# Patient Record
Sex: MALE | Race: WHITE | NOT HISPANIC OR LATINO | Employment: FULL TIME | URBAN - METROPOLITAN AREA
[De-identification: names, ages, dates, MRNs, and addresses within clinical notes are randomized per-mention and may not be internally consistent; named-entity substitution may affect disease eponyms.]

---

## 2023-10-26 PROBLEM — R05.1 ACUTE COUGH: Status: ACTIVE | Noted: 2022-09-26

## 2023-10-26 PROBLEM — R52 PAIN: Status: ACTIVE | Noted: 2023-10-26

## 2023-10-26 PROBLEM — R21 RASH: Status: ACTIVE | Noted: 2023-06-06

## 2023-10-26 PROBLEM — R22.0 FACIAL MASS: Status: ACTIVE | Noted: 2019-09-28

## 2023-10-26 PROBLEM — N50.89 SCROTAL SWELLING: Status: ACTIVE | Noted: 2023-10-26

## 2023-10-26 PROBLEM — U07.1 COVID-19: Status: ACTIVE | Noted: 2022-09-26

## 2023-10-26 PROBLEM — R35.1 NOCTURIA: Status: ACTIVE | Noted: 2022-10-27

## 2023-10-26 PROBLEM — R11.10 VOMITING: Status: ACTIVE | Noted: 2023-10-26

## 2023-10-26 PROBLEM — K62.5 RECTAL BLEEDING: Status: ACTIVE | Noted: 2018-09-23

## 2023-10-26 PROBLEM — R73.01 IMPAIRED FASTING GLUCOSE: Status: ACTIVE | Noted: 2021-10-25

## 2023-10-26 PROBLEM — J06.9 UPPER RESPIRATORY INFECTION: Status: ACTIVE | Noted: 2022-09-26

## 2023-10-26 PROBLEM — E78.5 HYPERLIPIDEMIA: Status: ACTIVE | Noted: 2019-09-26

## 2023-10-26 PROBLEM — N40.0 ENLARGED PROSTATE: Status: ACTIVE | Noted: 2018-09-23

## 2023-10-26 PROBLEM — E78.49 OTHER HYPERLIPIDEMIA: Status: ACTIVE | Noted: 2018-09-23

## 2023-10-26 PROBLEM — R53.83 FATIGUE: Status: ACTIVE | Noted: 2021-10-18

## 2023-10-26 PROBLEM — K58.9 IRRITABLE BOWEL SYNDROME: Status: ACTIVE | Noted: 2019-09-26

## 2023-10-26 PROBLEM — R63.5 WEIGHT GAIN: Status: ACTIVE | Noted: 2021-10-18

## 2023-10-26 PROBLEM — B86 SCABIES: Status: ACTIVE | Noted: 2023-05-15

## 2023-10-26 PROBLEM — K40.90 LEFT INGUINAL HERNIA: Status: ACTIVE | Noted: 2023-10-26

## 2023-10-26 PROBLEM — K57.90 DIVERTICULOSIS: Status: ACTIVE | Noted: 2018-09-23

## 2023-10-26 PROBLEM — E66.9 OBESITY: Status: ACTIVE | Noted: 2020-10-13

## 2023-10-26 PROBLEM — R97.20 ELEVATED PSA: Status: ACTIVE | Noted: 2021-05-04

## 2023-10-26 PROBLEM — E80.4 GILBERTS SYNDROME: Status: ACTIVE | Noted: 2020-10-13

## 2023-10-26 PROBLEM — R89.9 ABNORMAL LABORATORY TEST RESULT: Status: ACTIVE | Noted: 2020-11-17

## 2023-10-26 RX ORDER — NYSTATIN 100000 [USP'U]/ML
5 SUSPENSION ORAL 3 TIMES DAILY
COMMUNITY
End: 2023-11-03 | Stop reason: WASHOUT

## 2023-10-26 RX ORDER — ASPIRIN 81 MG/1
81 TABLET ORAL DAILY
COMMUNITY
End: 2023-11-03 | Stop reason: WASHOUT

## 2023-10-26 RX ORDER — MONTELUKAST SODIUM 4 MG/1
2 TABLET, CHEWABLE ORAL DAILY
COMMUNITY
End: 2023-11-03 | Stop reason: WASHOUT

## 2023-10-26 RX ORDER — CEFPROZIL 500 MG/1
500 TABLET, FILM COATED ORAL DAILY
COMMUNITY
End: 2023-11-03 | Stop reason: WASHOUT

## 2023-10-26 RX ORDER — HYDROCODONE BITARTRATE AND ACETAMINOPHEN 10; 325 MG/1; MG/1
1 TABLET ORAL EVERY 4 HOURS PRN
COMMUNITY
End: 2023-11-03 | Stop reason: WASHOUT

## 2023-10-26 RX ORDER — PERMETHRIN 50 MG/G
CREAM TOPICAL
COMMUNITY
Start: 2023-05-15 | End: 2023-11-03 | Stop reason: WASHOUT

## 2023-10-31 ENCOUNTER — ANCILLARY PROCEDURE (OUTPATIENT)
Dept: RADIOLOGY | Facility: CLINIC | Age: 68
End: 2023-10-31
Payer: COMMERCIAL

## 2023-10-31 ENCOUNTER — LAB (OUTPATIENT)
Dept: LAB | Facility: LAB | Age: 68
End: 2023-10-31
Payer: COMMERCIAL

## 2023-10-31 ENCOUNTER — OFFICE VISIT (OUTPATIENT)
Dept: PRIMARY CARE | Facility: CLINIC | Age: 68
End: 2023-10-31
Payer: COMMERCIAL

## 2023-10-31 VITALS
DIASTOLIC BLOOD PRESSURE: 76 MMHG | WEIGHT: 209 LBS | HEIGHT: 72 IN | HEART RATE: 80 BPM | SYSTOLIC BLOOD PRESSURE: 134 MMHG | BODY MASS INDEX: 28.31 KG/M2

## 2023-10-31 DIAGNOSIS — Z12.5 SCREENING FOR PROSTATE CANCER: ICD-10-CM

## 2023-10-31 DIAGNOSIS — E80.4 GILBERTS SYNDROME: ICD-10-CM

## 2023-10-31 DIAGNOSIS — Z00.00 ROUTINE MEDICAL EXAM: ICD-10-CM

## 2023-10-31 DIAGNOSIS — E78.49 OTHER HYPERLIPIDEMIA: ICD-10-CM

## 2023-10-31 DIAGNOSIS — E66.3 OVERWEIGHT: ICD-10-CM

## 2023-10-31 DIAGNOSIS — K61.1 PERIRECTAL ABSCESS: ICD-10-CM

## 2023-10-31 DIAGNOSIS — Z00.00 ROUTINE GENERAL MEDICAL EXAMINATION AT HEALTH CARE FACILITY: Primary | ICD-10-CM

## 2023-10-31 DIAGNOSIS — R73.01 IMPAIRED FASTING GLUCOSE: ICD-10-CM

## 2023-10-31 DIAGNOSIS — R97.20 ELEVATED PSA: ICD-10-CM

## 2023-10-31 PROBLEM — J06.9 UPPER RESPIRATORY INFECTION: Status: RESOLVED | Noted: 2022-09-26 | Resolved: 2023-10-31

## 2023-10-31 PROBLEM — B86 SCABIES: Status: RESOLVED | Noted: 2023-05-15 | Resolved: 2023-10-31

## 2023-10-31 PROBLEM — U07.1 COVID-19: Status: RESOLVED | Noted: 2022-09-26 | Resolved: 2023-10-31

## 2023-10-31 PROBLEM — R05.1 ACUTE COUGH: Status: RESOLVED | Noted: 2022-09-26 | Resolved: 2023-10-31

## 2023-10-31 PROBLEM — R21 RASH: Status: RESOLVED | Noted: 2023-06-06 | Resolved: 2023-10-31

## 2023-10-31 PROBLEM — R11.10 VOMITING: Status: RESOLVED | Noted: 2023-10-26 | Resolved: 2023-10-31

## 2023-10-31 LAB
ALBUMIN SERPL-MCNC: 4.7 G/DL (ref 3.5–5)
ALP BLD-CCNC: 84 U/L (ref 35–125)
ALT SERPL-CCNC: 16 U/L (ref 5–40)
ANION GAP SERPL CALC-SCNC: 12 MMOL/L
AST SERPL-CCNC: 18 U/L (ref 5–40)
BASOPHILS # BLD AUTO: 0.05 X10*3/UL (ref 0–0.1)
BASOPHILS NFR BLD AUTO: 0.7 %
BILIRUB SERPL-MCNC: 3.1 MG/DL (ref 0.1–1.2)
BUN SERPL-MCNC: 14 MG/DL (ref 8–25)
CALCIUM SERPL-MCNC: 9.6 MG/DL (ref 8.5–10.4)
CHLORIDE SERPL-SCNC: 103 MMOL/L (ref 97–107)
CHOLEST SERPL-MCNC: 191 MG/DL (ref 133–200)
CHOLEST/HDLC SERPL: 3.1 {RATIO}
CO2 SERPL-SCNC: 29 MMOL/L (ref 24–31)
CREAT SERPL-MCNC: 1 MG/DL (ref 0.4–1.6)
EOSINOPHIL # BLD AUTO: 0.22 X10*3/UL (ref 0–0.7)
EOSINOPHIL NFR BLD AUTO: 3.1 %
ERYTHROCYTE [DISTWIDTH] IN BLOOD BY AUTOMATED COUNT: 13.2 % (ref 11.5–14.5)
EST. AVERAGE GLUCOSE BLD GHB EST-MCNC: 105 MG/DL
GFR SERPL CREATININE-BSD FRML MDRD: 82 ML/MIN/1.73M*2
GLUCOSE SERPL-MCNC: 89 MG/DL (ref 65–99)
HBA1C MFR BLD: 5.3 %
HCT VFR BLD AUTO: 45.4 % (ref 41–52)
HDLC SERPL-MCNC: 61 MG/DL
HGB BLD-MCNC: 14.9 G/DL (ref 13.5–17.5)
IMM GRANULOCYTES # BLD AUTO: 0.02 X10*3/UL (ref 0–0.7)
IMM GRANULOCYTES NFR BLD AUTO: 0.3 % (ref 0–0.9)
LDLC SERPL CALC-MCNC: 109 MG/DL (ref 65–130)
LYMPHOCYTES # BLD AUTO: 1.54 X10*3/UL (ref 1.2–4.8)
LYMPHOCYTES NFR BLD AUTO: 22 %
MCH RBC QN AUTO: 30.9 PG (ref 26–34)
MCHC RBC AUTO-ENTMCNC: 32.8 G/DL (ref 32–36)
MCV RBC AUTO: 94 FL (ref 80–100)
MONOCYTES # BLD AUTO: 0.82 X10*3/UL (ref 0.1–1)
MONOCYTES NFR BLD AUTO: 11.7 %
NEUTROPHILS # BLD AUTO: 4.35 X10*3/UL (ref 1.2–7.7)
NEUTROPHILS NFR BLD AUTO: 62.2 %
NRBC BLD-RTO: 0 /100 WBCS (ref 0–0)
PLATELET # BLD AUTO: 322 X10*3/UL (ref 150–450)
PMV BLD AUTO: 10.1 FL (ref 7.5–11.5)
POC APPEARANCE, URINE: CLEAR
POC BILIRUBIN, URINE: NEGATIVE
POC BLOOD, URINE: ABNORMAL
POC COLOR, URINE: YELLOW
POC GLUCOSE, URINE: NEGATIVE MG/DL
POC KETONES, URINE: NEGATIVE MG/DL
POC LEUKOCYTES, URINE: NEGATIVE
POC NITRITE,URINE: NEGATIVE
POC PH, URINE: 5.5 PH
POC PROTEIN, URINE: NEGATIVE MG/DL
POC SPECIFIC GRAVITY, URINE: 1.02
POC UROBILINOGEN, URINE: 0.2 EU/DL
POTASSIUM SERPL-SCNC: 4.8 MMOL/L (ref 3.4–5.1)
PROT SERPL-MCNC: 6.9 G/DL (ref 5.9–7.9)
PSA SERPL-MCNC: 3.9 NG/ML
RBC # BLD AUTO: 4.82 X10*6/UL (ref 4.5–5.9)
SODIUM SERPL-SCNC: 144 MMOL/L (ref 133–145)
TRIGL SERPL-MCNC: 107 MG/DL (ref 40–150)
TSH SERPL DL<=0.05 MIU/L-ACNC: 2.43 MIU/L (ref 0.27–4.2)
WBC # BLD AUTO: 7 X10*3/UL (ref 4.4–11.3)

## 2023-10-31 PROCEDURE — G0439 PPPS, SUBSEQ VISIT: HCPCS | Performed by: PHYSICIAN ASSISTANT

## 2023-10-31 PROCEDURE — 36415 COLL VENOUS BLD VENIPUNCTURE: CPT

## 2023-10-31 PROCEDURE — 81003 URINALYSIS AUTO W/O SCOPE: CPT | Performed by: PHYSICIAN ASSISTANT

## 2023-10-31 PROCEDURE — 80061 LIPID PANEL: CPT

## 2023-10-31 PROCEDURE — 85025 COMPLETE CBC W/AUTO DIFF WBC: CPT

## 2023-10-31 PROCEDURE — 84153 ASSAY OF PSA TOTAL: CPT

## 2023-10-31 PROCEDURE — 1159F MED LIST DOCD IN RCRD: CPT | Performed by: PHYSICIAN ASSISTANT

## 2023-10-31 PROCEDURE — 80053 COMPREHEN METABOLIC PANEL: CPT

## 2023-10-31 PROCEDURE — 84443 ASSAY THYROID STIM HORMONE: CPT

## 2023-10-31 PROCEDURE — 1160F RVW MEDS BY RX/DR IN RCRD: CPT | Performed by: PHYSICIAN ASSISTANT

## 2023-10-31 PROCEDURE — 83036 HEMOGLOBIN GLYCOSYLATED A1C: CPT

## 2023-10-31 PROCEDURE — 71046 X-RAY EXAM CHEST 2 VIEWS: CPT | Mod: FY

## 2023-10-31 PROCEDURE — 1036F TOBACCO NON-USER: CPT | Performed by: PHYSICIAN ASSISTANT

## 2023-10-31 PROCEDURE — 1126F AMNT PAIN NOTED NONE PRSNT: CPT | Performed by: PHYSICIAN ASSISTANT

## 2023-10-31 RX ORDER — AMOXICILLIN AND CLAVULANATE POTASSIUM 500; 125 MG/1; MG/1
500 TABLET, FILM COATED ORAL 3 TIMES DAILY
Qty: 21 TABLET | Refills: 0 | Status: SHIPPED | OUTPATIENT
Start: 2023-10-31 | End: 2023-11-07

## 2023-10-31 ASSESSMENT — PAIN SCALES - GENERAL: PAINLEVEL: 0-NO PAIN

## 2023-10-31 NOTE — PROGRESS NOTES
"Subjective   Reason for Visit: Rios Malcolm is an 68 y.o. male here for a Medicare Wellness visit.          Reviewed all medications by prescribing practitioner or clinical pharmacist (such as prescriptions, OTCs, herbal therapies and supplements) and documented in the medical record.    Hyperlipidemia  This is a chronic problem. The current episode started more than 1 year ago. The problem is controlled. He has no history of obesity. Associated symptoms include myalgias. Pertinent negatives include no chest pain, focal weakness, leg pain or shortness of breath. Compliance problems include adherence to exercise and adherence to diet.  Risk factors for coronary artery disease include male sex and dyslipidemia.   Benign Prostatic Hypertrophy  This is a chronic problem. The problem is unchanged. Pertinent negatives include no dysuria.       Patient Care Team:  Jagruti Franks PA-C as PCP - General (Family Medicine)     Review of Systems   Constitutional:  Negative for activity change and appetite change.   HENT:  Negative for nosebleeds.    Eyes:  Negative for visual disturbance.   Respiratory:  Negative for chest tightness and shortness of breath.    Cardiovascular:  Negative for chest pain.   Gastrointestinal:  Negative for abdominal pain, blood in stool, constipation and diarrhea.   Genitourinary:  Negative for dysuria and testicular pain.   Musculoskeletal:  Positive for arthralgias and myalgias.   Skin:  Negative for color change.   Neurological:  Negative for focal weakness, numbness and headaches.   Psychiatric/Behavioral:  Negative for agitation.        Objective   Vitals:  /76   Pulse 80   Ht 1.822 m (5' 11.75\")   Wt 94.8 kg (209 lb)   BMI 28.54 kg/m²       Physical Exam  HENT:      Head: Normocephalic.      Right Ear: Tympanic membrane normal.      Left Ear: Tympanic membrane normal.      Nose: Nose normal.      Mouth/Throat:      Mouth: Mucous membranes are moist.   Eyes:      Extraocular " Movements: Extraocular movements intact.      Pupils: Pupils are equal, round, and reactive to light.   Neck:      Thyroid: No thyroid mass or thyromegaly.      Vascular: No carotid bruit.   Cardiovascular:      Rate and Rhythm: Normal rate and regular rhythm.      Pulses: Normal pulses.      Heart sounds: No murmur heard.  Pulmonary:      Effort: Pulmonary effort is normal.   Abdominal:      General: Abdomen is flat. Bowel sounds are normal.      Tenderness: There is no abdominal tenderness.      Hernia: There is no hernia in the left inguinal area or right inguinal area.   Genitourinary:     Penis: Normal.       Testes: Normal.      Comments: Anal area he has a small slightly protruding circular erythematous lesionWith minimal drainage.  He has a history of perirectal abscesses.  Musculoskeletal:      Cervical back: Normal range of motion.   Lymphadenopathy:      Cervical: No cervical adenopathy.   Skin:     General: Skin is warm.   Neurological:      Mental Status: He is alert. Mental status is at baseline.         Assessment/Plan   Problem List Items Addressed This Visit       Impaired fasting glucose    Hyperlipidemia     Other Visit Diagnoses       Routine general medical examination at health care facility    -  Primary    Routine medical exam        Screening for prostate cancer              We will treat for the abscess as well.  Continue warm sits baths and compresses should follow-up with Dr. Sinclair.

## 2023-11-01 DIAGNOSIS — R97.20 ELEVATED PSA: ICD-10-CM

## 2023-11-01 DIAGNOSIS — E80.4 GILBERTS SYNDROME: Primary | ICD-10-CM

## 2023-11-03 ASSESSMENT — ENCOUNTER SYMPTOMS
HEADACHES: 0
BLOOD IN STOOL: 0
APPETITE CHANGE: 0
DIARRHEA: 0
ABDOMINAL PAIN: 0
NUMBNESS: 0
AGITATION: 0
ACTIVITY CHANGE: 0
FOCAL WEAKNESS: 0
CHEST TIGHTNESS: 0
LEG PAIN: 0
COLOR CHANGE: 0
DYSURIA: 0
MYALGIAS: 1
ARTHRALGIAS: 1
CONSTIPATION: 0
SHORTNESS OF BREATH: 0

## 2023-11-10 ENCOUNTER — OFFICE VISIT (OUTPATIENT)
Dept: SURGERY | Facility: CLINIC | Age: 68
End: 2023-11-10
Payer: COMMERCIAL

## 2023-11-10 VITALS
HEART RATE: 56 BPM | BODY MASS INDEX: 28.71 KG/M2 | TEMPERATURE: 97.5 F | WEIGHT: 212 LBS | HEIGHT: 72 IN | SYSTOLIC BLOOD PRESSURE: 160 MMHG | DIASTOLIC BLOOD PRESSURE: 96 MMHG

## 2023-11-10 DIAGNOSIS — K61.1 PERIRECTAL ABSCESS: ICD-10-CM

## 2023-11-10 DIAGNOSIS — K60.3 ANAL FISTULA: Primary | ICD-10-CM

## 2023-11-10 PROCEDURE — 1126F AMNT PAIN NOTED NONE PRSNT: CPT | Performed by: SURGERY

## 2023-11-10 PROCEDURE — 99213 OFFICE O/P EST LOW 20 MIN: CPT | Performed by: SURGERY

## 2023-11-10 PROCEDURE — 1160F RVW MEDS BY RX/DR IN RCRD: CPT | Performed by: SURGERY

## 2023-11-10 PROCEDURE — 1159F MED LIST DOCD IN RCRD: CPT | Performed by: SURGERY

## 2023-11-10 PROCEDURE — 99203 OFFICE O/P NEW LOW 30 MIN: CPT | Performed by: SURGERY

## 2023-11-10 PROCEDURE — 1036F TOBACCO NON-USER: CPT | Performed by: SURGERY

## 2023-11-10 ASSESSMENT — ENCOUNTER SYMPTOMS
LOSS OF SENSATION IN FEET: 0
OCCASIONAL FEELINGS OF UNSTEADINESS: 0
EYES NEGATIVE: 1
CARDIOVASCULAR NEGATIVE: 1
GASTROINTESTINAL NEGATIVE: 1
DEPRESSION: 0
CONSTITUTIONAL NEGATIVE: 1
RESPIRATORY NEGATIVE: 1

## 2023-11-10 ASSESSMENT — PATIENT HEALTH QUESTIONNAIRE - PHQ9
2. FEELING DOWN, DEPRESSED OR HOPELESS: NOT AT ALL
SUM OF ALL RESPONSES TO PHQ9 QUESTIONS 1 & 2: 0
1. LITTLE INTEREST OR PLEASURE IN DOING THINGS: NOT AT ALL

## 2023-11-10 ASSESSMENT — PAIN SCALES - GENERAL: PAINLEVEL: 0-NO PAIN

## 2023-11-10 NOTE — PROGRESS NOTES
[facility]: GENERAL SURGERY  PROGRESS NOTE      Rios Malcolm is a 68 y.o. male that is presenting today for New Patient Visit (Ref from [facility] for clara rectal abscess. Has clear/yellow color, no odor. Small amount of blood).    ASSESSMENT / PLAN:  Diagnoses and all orders for this visit:  Anal fistula  Plan for exam under anesthesia with fistulotomy and possible seton placement., Risks and benefits of this procedure were discussed with patient, including: post operative pain, bleeding, infection, chronic wound, anal stenosis, incontinence, change in bowel function, urinary retention, need for additional intervention, and recurrence of fistula tract    Patient Active Problem List   Diagnosis    Weight gain    Scrotal swelling    Rectal bleeding    Pain    Obesity    Nocturia    Left inguinal hernia    Irritable bowel syndrome    Impaired fasting glucose    Hyperlipidemia    [place] syndrome    Fatigue    Facial mass    Enlarged prostate    Elevated PSA    Diverticulosis    Abnormal laboratory test result     Subjective   Seen by PCP 10/31/2023, history of perirectal abscess and wound with drainage.  Review of Systems   Constitutional: Negative.    HENT: Negative.     Eyes: Negative.    Respiratory: Negative.     Cardiovascular: Negative.    Gastrointestinal: Negative.    Genitourinary: Negative.    Skin: Negative.    All other systems reviewed and are negative.     Objective   Vitals:    11/10/23 1414   BP: (!) 160/96   Pulse: 56   Temp: 36.4 °C (97.5 °F)      Physical Exam  Constitutional:       Appearance: Normal appearance.   HENT:      Head: Normocephalic.   Eyes:      Pupils: Pupils are equal, round, and reactive to light.   Cardiovascular:      Rate and Rhythm: Normal rate.   Pulmonary:      Effort: Pulmonary effort is normal.   Abdominal:      General: Abdomen is flat. Bowel sounds are normal.      Palpations: Abdomen is soft.   Genitourinary:     Comments: The patient was placed in  "left lateral decubitus position. Perianal skin was examined with an opening in the posterior midline and a palpable tract stent with a small abscess and anal fistula.  This was actively draining purulence on exam..  No external hemorrhoids identified.  Digital rectal exam revealed normal tone with good squeeze.  No palpable masses appreciated.  Skin:     General: Skin is warm.   Neurological:      General: No focal deficit present.      Mental Status: He is alert.         Diagnostic Results   Lab Results   Component Value Date    GLUCOSE 89 10/31/2023    CALCIUM 9.6 10/31/2023     10/31/2023    K 4.8 10/31/2023    CO2 29 10/31/2023     10/31/2023    BUN 14 10/31/2023    CREATININE 1.00 10/31/2023     Lab Results   Component Value Date    ALT 16 10/31/2023    AST 18 10/31/2023    ALKPHOS 84 10/31/2023    BILITOT 3.1 (H) 10/31/2023     Lab Results   Component Value Date    WBC 7.0 10/31/2023    HGB 14.9 10/31/2023    HCT 45.4 10/31/2023    MCV 94 10/31/2023     10/31/2023     Lab Results   Component Value Date    CHOL 191 10/31/2023    CHOL 205 (H) 10/27/2022    CHOL 189 10/18/2021     Lab Results   Component Value Date    HDL 61.0 10/31/2023    HDL 69 10/27/2022    HDL 62 10/18/2021     Lab Results   Component Value Date    LDLCALC 109 10/31/2023    LDLCALC 118 10/27/2022    LDLCALC 106 10/18/2021     Lab Results   Component Value Date    TRIG 107 10/31/2023    TRIG 90 10/27/2022    TRIG 105 10/18/2021     No components found for: \"CHOLHDL\"  Lab Results   Component Value Date    HGBA1C 5.3 10/31/2023     Other labs not included in the list above were reviewed either before or during this encounter.    History    Past Medical History:   Diagnosis Date    Abscess     clara rectal    Gallbladder attack     Hyperlipemia      Past Surgical History:   Procedure Laterality Date    ABSCESS DRAINAGE  2006    clara rectal abscess    CHOLECYSTECTOMY      HERNIA REPAIR      RIGHT COLECTOMY       Family History "   Problem Relation Name Age of Onset    No Known Problems Father      Breast cancer Sister      Ovarian cancer Sister       No Known Allergies  Current Outpatient Medications on File Prior to Visit   Medication Sig Dispense Refill    [] amoxicillin-pot clavulanate (Augmentin) 500-125 mg tablet Take 1 tablet (500 mg) by mouth 3 times a day for 7 days. 21 tablet 0     No current facility-administered medications on file prior to visit.     Immunization History   Administered Date(s) Administered    Flu vaccine, quadrivalent, high-dose, preservative free, age 65y+ (FLUZONE) 10/18/2021, 10/27/2022    Influenza, injectable, quadrivalent 2018, 2019, 10/13/2020    Influenza, seasonal, injectable 2014, 2017    Influenza, seasonal, injectable, preservative free 2012, 2015    Moderna SARS-CoV-2 Vaccination 2021, 2021    Pfizer COVID-19 vaccine, Fall , 12 years and older, (30mcg/0.3mL) 10/07/2023    Pfizer COVID-19 vaccine, bivalent, age 12 years and older (30 mcg/0.3 mL) 2023    Pfizer Gray Cap SARS-CoV-2 2022    Pfizer Purple Cap SARS-CoV-2 2021    Pneumococcal polysaccharide vaccine, 23-valent, age 2 years and older (PNEUMOVAX 23) 2013, 2018    Tdap vaccine, age 7 year and older (BOOSTRIX) 2011, 10/18/2021    Zoster vaccine, recombinant, adult (SHINGRIX) 2022, 2023     Patient's medical history was reviewed and updated either before or during this encounter.    Raimundo Taylor MD

## 2023-11-10 NOTE — H&P (VIEW-ONLY)
[facility]: GENERAL SURGERY  PROGRESS NOTE      Rios Malcolm is a 68 y.o. male that is presenting today for New Patient Visit (Ref from [facility] for clara rectal abscess. Has clear/yellow color, no odor. Small amount of blood).    ASSESSMENT / PLAN:  Diagnoses and all orders for this visit:  Anal fistula  Plan for exam under anesthesia with fistulotomy and possible seton placement., Risks and benefits of this procedure were discussed with patient, including: post operative pain, bleeding, infection, chronic wound, anal stenosis, incontinence, change in bowel function, urinary retention, need for additional intervention, and recurrence of fistula tract    Patient Active Problem List   Diagnosis    Weight gain    Scrotal swelling    Rectal bleeding    Pain    Obesity    Nocturia    Left inguinal hernia    Irritable bowel syndrome    Impaired fasting glucose    Hyperlipidemia    [place] syndrome    Fatigue    Facial mass    Enlarged prostate    Elevated PSA    Diverticulosis    Abnormal laboratory test result     Subjective   Seen by PCP 10/31/2023, history of perirectal abscess and wound with drainage.  Review of Systems   Constitutional: Negative.    HENT: Negative.     Eyes: Negative.    Respiratory: Negative.     Cardiovascular: Negative.    Gastrointestinal: Negative.    Genitourinary: Negative.    Skin: Negative.    All other systems reviewed and are negative.     Objective   Vitals:    11/10/23 1414   BP: (!) 160/96   Pulse: 56   Temp: 36.4 °C (97.5 °F)      Physical Exam  Constitutional:       Appearance: Normal appearance.   HENT:      Head: Normocephalic.   Eyes:      Pupils: Pupils are equal, round, and reactive to light.   Cardiovascular:      Rate and Rhythm: Normal rate.   Pulmonary:      Effort: Pulmonary effort is normal.   Abdominal:      General: Abdomen is flat. Bowel sounds are normal.      Palpations: Abdomen is soft.   Genitourinary:     Comments: The patient was placed in  "left lateral decubitus position. Perianal skin was examined with an opening in the posterior midline and a palpable tract stent with a small abscess and anal fistula.  This was actively draining purulence on exam..  No external hemorrhoids identified.  Digital rectal exam revealed normal tone with good squeeze.  No palpable masses appreciated.  Skin:     General: Skin is warm.   Neurological:      General: No focal deficit present.      Mental Status: He is alert.         Diagnostic Results   Lab Results   Component Value Date    GLUCOSE 89 10/31/2023    CALCIUM 9.6 10/31/2023     10/31/2023    K 4.8 10/31/2023    CO2 29 10/31/2023     10/31/2023    BUN 14 10/31/2023    CREATININE 1.00 10/31/2023     Lab Results   Component Value Date    ALT 16 10/31/2023    AST 18 10/31/2023    ALKPHOS 84 10/31/2023    BILITOT 3.1 (H) 10/31/2023     Lab Results   Component Value Date    WBC 7.0 10/31/2023    HGB 14.9 10/31/2023    HCT 45.4 10/31/2023    MCV 94 10/31/2023     10/31/2023     Lab Results   Component Value Date    CHOL 191 10/31/2023    CHOL 205 (H) 10/27/2022    CHOL 189 10/18/2021     Lab Results   Component Value Date    HDL 61.0 10/31/2023    HDL 69 10/27/2022    HDL 62 10/18/2021     Lab Results   Component Value Date    LDLCALC 109 10/31/2023    LDLCALC 118 10/27/2022    LDLCALC 106 10/18/2021     Lab Results   Component Value Date    TRIG 107 10/31/2023    TRIG 90 10/27/2022    TRIG 105 10/18/2021     No components found for: \"CHOLHDL\"  Lab Results   Component Value Date    HGBA1C 5.3 10/31/2023     Other labs not included in the list above were reviewed either before or during this encounter.    History    Past Medical History:   Diagnosis Date    Abscess     clara rectal    Gallbladder attack     Hyperlipemia      Past Surgical History:   Procedure Laterality Date    ABSCESS DRAINAGE  2006    calra rectal abscess    CHOLECYSTECTOMY      HERNIA REPAIR      RIGHT COLECTOMY       Family History "   Problem Relation Name Age of Onset    No Known Problems Father      Breast cancer Sister      Ovarian cancer Sister       No Known Allergies  Current Outpatient Medications on File Prior to Visit   Medication Sig Dispense Refill    [] amoxicillin-pot clavulanate (Augmentin) 500-125 mg tablet Take 1 tablet (500 mg) by mouth 3 times a day for 7 days. 21 tablet 0     No current facility-administered medications on file prior to visit.     Immunization History   Administered Date(s) Administered    Flu vaccine, quadrivalent, high-dose, preservative free, age 65y+ (FLUZONE) 10/18/2021, 10/27/2022    Influenza, injectable, quadrivalent 2018, 2019, 10/13/2020    Influenza, seasonal, injectable 2014, 2017    Influenza, seasonal, injectable, preservative free 2012, 2015    Moderna SARS-CoV-2 Vaccination 2021, 2021    Pfizer COVID-19 vaccine, Fall , 12 years and older, (30mcg/0.3mL) 10/07/2023    Pfizer COVID-19 vaccine, bivalent, age 12 years and older (30 mcg/0.3 mL) 2023    Pfizer Gray Cap SARS-CoV-2 2022    Pfizer Purple Cap SARS-CoV-2 2021    Pneumococcal polysaccharide vaccine, 23-valent, age 2 years and older (PNEUMOVAX 23) 2013, 2018    Tdap vaccine, age 7 year and older (BOOSTRIX) 2011, 10/18/2021    Zoster vaccine, recombinant, adult (SHINGRIX) 2022, 2023     Patient's medical history was reviewed and updated either before or during this encounter.    Raimundo Taylor MD

## 2023-11-16 ENCOUNTER — PREP FOR PROCEDURE (OUTPATIENT)
Dept: INTERNAL MEDICINE | Facility: HOSPITAL | Age: 68
End: 2023-11-16
Payer: COMMERCIAL

## 2023-11-16 DIAGNOSIS — K60.3 ANAL FISTULA: ICD-10-CM

## 2023-11-16 DIAGNOSIS — K60.3 ANAL FISSURE AND FISTULA: Primary | ICD-10-CM

## 2023-11-16 DIAGNOSIS — K60.2 ANAL FISSURE AND FISTULA: Primary | ICD-10-CM

## 2023-11-16 RX ORDER — ACETAMINOPHEN 325 MG/1
650 TABLET ORAL ONCE
Status: CANCELLED | OUTPATIENT
Start: 2023-11-16 | End: 2023-11-16

## 2023-11-20 PROBLEM — K60.3 ANAL FISTULA: Status: ACTIVE | Noted: 2023-11-16

## 2023-11-20 PROBLEM — K60.30 ANAL FISTULA: Status: ACTIVE | Noted: 2023-11-16

## 2023-12-07 ENCOUNTER — HOSPITAL ENCOUNTER (OUTPATIENT)
Facility: HOSPITAL | Age: 68
Setting detail: OUTPATIENT SURGERY
Discharge: HOME | End: 2023-12-07
Attending: SURGERY | Admitting: SURGERY
Payer: COMMERCIAL

## 2023-12-07 ENCOUNTER — ANESTHESIA (OUTPATIENT)
Dept: OPERATING ROOM | Facility: HOSPITAL | Age: 68
End: 2023-12-07
Payer: COMMERCIAL

## 2023-12-07 ENCOUNTER — PHARMACY VISIT (OUTPATIENT)
Dept: PHARMACY | Facility: CLINIC | Age: 68
End: 2023-12-07
Payer: MEDICARE

## 2023-12-07 ENCOUNTER — ANESTHESIA EVENT (OUTPATIENT)
Dept: OPERATING ROOM | Facility: HOSPITAL | Age: 68
End: 2023-12-07
Payer: COMMERCIAL

## 2023-12-07 VITALS
HEART RATE: 56 BPM | OXYGEN SATURATION: 97 % | WEIGHT: 212 LBS | TEMPERATURE: 97 F | RESPIRATION RATE: 14 BRPM | BODY MASS INDEX: 28.75 KG/M2 | SYSTOLIC BLOOD PRESSURE: 139 MMHG | DIASTOLIC BLOOD PRESSURE: 69 MMHG

## 2023-12-07 DIAGNOSIS — K60.3 ANAL FISTULA: Primary | ICD-10-CM

## 2023-12-07 PROCEDURE — 2720000007 HC OR 272 NO HCPCS: Performed by: SURGERY

## 2023-12-07 PROCEDURE — 3700000002 HC GENERAL ANESTHESIA TIME - EACH INCREMENTAL 1 MINUTE: Performed by: SURGERY

## 2023-12-07 PROCEDURE — 3600000003 HC OR TIME - INITIAL BASE CHARGE - PROCEDURE LEVEL THREE: Performed by: SURGERY

## 2023-12-07 PROCEDURE — 46275 REMOVE ANAL FIST INTER: CPT | Performed by: SURGERY

## 2023-12-07 PROCEDURE — 2500000005 HC RX 250 GENERAL PHARMACY W/O HCPCS: Performed by: NURSE ANESTHETIST, CERTIFIED REGISTERED

## 2023-12-07 PROCEDURE — 3600000008 HC OR TIME - EACH INCREMENTAL 1 MINUTE - PROCEDURE LEVEL THREE: Performed by: SURGERY

## 2023-12-07 PROCEDURE — 2500000004 HC RX 250 GENERAL PHARMACY W/ HCPCS (ALT 636 FOR OP/ED): Performed by: NURSE ANESTHETIST, CERTIFIED REGISTERED

## 2023-12-07 PROCEDURE — 7100000001 HC RECOVERY ROOM TIME - INITIAL BASE CHARGE: Performed by: SURGERY

## 2023-12-07 PROCEDURE — RXMED WILLOW AMBULATORY MEDICATION CHARGE

## 2023-12-07 PROCEDURE — 7100000002 HC RECOVERY ROOM TIME - EACH INCREMENTAL 1 MINUTE: Performed by: SURGERY

## 2023-12-07 PROCEDURE — 94760 N-INVAS EAR/PLS OXIMETRY 1: CPT

## 2023-12-07 PROCEDURE — A46270 PR REMOVAL ANAL FISTULA,SUBCUTANEOUS: Performed by: ANESTHESIOLOGY

## 2023-12-07 PROCEDURE — 3700000001 HC GENERAL ANESTHESIA TIME - INITIAL BASE CHARGE: Performed by: SURGERY

## 2023-12-07 PROCEDURE — 7100000010 HC PHASE TWO TIME - EACH INCREMENTAL 1 MINUTE: Performed by: SURGERY

## 2023-12-07 PROCEDURE — A46270 PR REMOVAL ANAL FISTULA,SUBCUTANEOUS: Performed by: NURSE ANESTHETIST, CERTIFIED REGISTERED

## 2023-12-07 PROCEDURE — 2500000005 HC RX 250 GENERAL PHARMACY W/O HCPCS: Performed by: SURGERY

## 2023-12-07 PROCEDURE — 7100000009 HC PHASE TWO TIME - INITIAL BASE CHARGE: Performed by: SURGERY

## 2023-12-07 RX ORDER — MIDAZOLAM HYDROCHLORIDE 1 MG/ML
INJECTION, SOLUTION INTRAMUSCULAR; INTRAVENOUS AS NEEDED
Status: DISCONTINUED | OUTPATIENT
Start: 2023-12-07 | End: 2023-12-07

## 2023-12-07 RX ORDER — BUPIVACAINE HYDROCHLORIDE 5 MG/ML
INJECTION, SOLUTION PERINEURAL AS NEEDED
Status: DISCONTINUED | OUTPATIENT
Start: 2023-12-07 | End: 2023-12-07 | Stop reason: HOSPADM

## 2023-12-07 RX ORDER — OXYCODONE HYDROCHLORIDE 5 MG/1
5 TABLET ORAL EVERY 6 HOURS PRN
Qty: 14 TABLET | Refills: 0 | Status: SHIPPED | OUTPATIENT
Start: 2023-12-07

## 2023-12-07 RX ORDER — IBUPROFEN 600 MG/1
600 TABLET ORAL EVERY 6 HOURS PRN
Qty: 30 TABLET | Refills: 0 | Status: SHIPPED | OUTPATIENT
Start: 2023-12-07

## 2023-12-07 RX ORDER — POLYETHYLENE GLYCOL 3350 17 G/17G
17 POWDER, FOR SOLUTION ORAL DAILY PRN
Qty: 238 G | Refills: 0 | Status: SHIPPED | OUTPATIENT
Start: 2023-12-07

## 2023-12-07 RX ORDER — FENTANYL CITRATE 50 UG/ML
INJECTION, SOLUTION INTRAMUSCULAR; INTRAVENOUS AS NEEDED
Status: DISCONTINUED | OUTPATIENT
Start: 2023-12-07 | End: 2023-12-07

## 2023-12-07 RX ORDER — ONDANSETRON HYDROCHLORIDE 2 MG/ML
4 INJECTION, SOLUTION INTRAVENOUS ONCE AS NEEDED
Status: DISCONTINUED | OUTPATIENT
Start: 2023-12-07 | End: 2023-12-07 | Stop reason: HOSPADM

## 2023-12-07 RX ORDER — KETOROLAC TROMETHAMINE 30 MG/ML
INJECTION, SOLUTION INTRAMUSCULAR; INTRAVENOUS AS NEEDED
Status: DISCONTINUED | OUTPATIENT
Start: 2023-12-07 | End: 2023-12-07

## 2023-12-07 RX ORDER — ALBUTEROL SULFATE 0.83 MG/ML
2.5 SOLUTION RESPIRATORY (INHALATION) ONCE AS NEEDED
Status: DISCONTINUED | OUTPATIENT
Start: 2023-12-07 | End: 2023-12-07 | Stop reason: HOSPADM

## 2023-12-07 RX ORDER — ONDANSETRON HYDROCHLORIDE 2 MG/ML
INJECTION, SOLUTION INTRAVENOUS AS NEEDED
Status: DISCONTINUED | OUTPATIENT
Start: 2023-12-07 | End: 2023-12-07

## 2023-12-07 RX ORDER — LIDOCAINE HYDROCHLORIDE AND EPINEPHRINE 10; 10 MG/ML; UG/ML
INJECTION, SOLUTION INFILTRATION; PERINEURAL AS NEEDED
Status: DISCONTINUED | OUTPATIENT
Start: 2023-12-07 | End: 2023-12-07 | Stop reason: HOSPADM

## 2023-12-07 RX ORDER — LABETALOL HYDROCHLORIDE 5 MG/ML
5 INJECTION, SOLUTION INTRAVENOUS ONCE AS NEEDED
Status: DISCONTINUED | OUTPATIENT
Start: 2023-12-07 | End: 2023-12-07 | Stop reason: HOSPADM

## 2023-12-07 RX ORDER — LIDOCAINE HYDROCHLORIDE 10 MG/ML
INJECTION INFILTRATION; PERINEURAL AS NEEDED
Status: DISCONTINUED | OUTPATIENT
Start: 2023-12-07 | End: 2023-12-07

## 2023-12-07 RX ORDER — SODIUM CHLORIDE, SODIUM LACTATE, POTASSIUM CHLORIDE, CALCIUM CHLORIDE 600; 310; 30; 20 MG/100ML; MG/100ML; MG/100ML; MG/100ML
100 INJECTION, SOLUTION INTRAVENOUS CONTINUOUS
Status: DISCONTINUED | OUTPATIENT
Start: 2023-12-07 | End: 2023-12-07 | Stop reason: HOSPADM

## 2023-12-07 RX ORDER — ACETAMINOPHEN 325 MG/1
650 TABLET ORAL EVERY 6 HOURS PRN
Qty: 30 TABLET | Refills: 0 | Status: SHIPPED | OUTPATIENT
Start: 2023-12-07

## 2023-12-07 RX ORDER — HYDRALAZINE HYDROCHLORIDE 20 MG/ML
5 INJECTION INTRAMUSCULAR; INTRAVENOUS EVERY 30 MIN PRN
Status: DISCONTINUED | OUTPATIENT
Start: 2023-12-07 | End: 2023-12-07 | Stop reason: HOSPADM

## 2023-12-07 RX ORDER — PROPOFOL 10 MG/ML
INJECTION, EMULSION INTRAVENOUS AS NEEDED
Status: DISCONTINUED | OUTPATIENT
Start: 2023-12-07 | End: 2023-12-07

## 2023-12-07 RX ORDER — FENTANYL CITRATE 50 UG/ML
50 INJECTION, SOLUTION INTRAMUSCULAR; INTRAVENOUS EVERY 5 MIN PRN
Status: DISCONTINUED | OUTPATIENT
Start: 2023-12-07 | End: 2023-12-07 | Stop reason: HOSPADM

## 2023-12-07 RX ORDER — ACETAMINOPHEN 325 MG/1
650 TABLET ORAL ONCE
Status: COMPLETED | OUTPATIENT
Start: 2023-12-07 | End: 2023-12-07

## 2023-12-07 RX ADMIN — ONDANSETRON HYDROCHLORIDE 4 MG: 2 INJECTION INTRAMUSCULAR; INTRAVENOUS at 16:29

## 2023-12-07 RX ADMIN — PROPOFOL 180 MG: 10 INJECTION, EMULSION INTRAVENOUS at 15:57

## 2023-12-07 RX ADMIN — SODIUM CHLORIDE, SODIUM LACTATE, POTASSIUM CHLORIDE, AND CALCIUM CHLORIDE: 600; 310; 30; 20 INJECTION, SOLUTION INTRAVENOUS at 16:32

## 2023-12-07 RX ADMIN — MIDAZOLAM HYDROCHLORIDE 2 MG: 1 INJECTION, SOLUTION INTRAMUSCULAR; INTRAVENOUS at 15:48

## 2023-12-07 RX ADMIN — KETOROLAC TROMETHAMINE 30 MG: 30 INJECTION, SOLUTION INTRAMUSCULAR at 16:29

## 2023-12-07 RX ADMIN — FENTANYL CITRATE 25 MCG: 0.05 INJECTION, SOLUTION INTRAMUSCULAR; INTRAVENOUS at 16:20

## 2023-12-07 RX ADMIN — FENTANYL CITRATE 50 MCG: 0.05 INJECTION, SOLUTION INTRAMUSCULAR; INTRAVENOUS at 15:57

## 2023-12-07 RX ADMIN — ACETAMINOPHEN 650 MG: 325 TABLET ORAL at 11:47

## 2023-12-07 RX ADMIN — SODIUM CHLORIDE, SODIUM LACTATE, POTASSIUM CHLORIDE, AND CALCIUM CHLORIDE: 600; 310; 30; 20 INJECTION, SOLUTION INTRAVENOUS at 15:46

## 2023-12-07 RX ADMIN — LIDOCAINE HYDROCHLORIDE 50 MG: 10 INJECTION, SOLUTION INFILTRATION; PERINEURAL at 15:57

## 2023-12-07 SDOH — HEALTH STABILITY: MENTAL HEALTH: CURRENT SMOKER: 0

## 2023-12-07 ASSESSMENT — COLUMBIA-SUICIDE SEVERITY RATING SCALE - C-SSRS
6. HAVE YOU EVER DONE ANYTHING, STARTED TO DO ANYTHING, OR PREPARED TO DO ANYTHING TO END YOUR LIFE?: NO
1. IN THE PAST MONTH, HAVE YOU WISHED YOU WERE DEAD OR WISHED YOU COULD GO TO SLEEP AND NOT WAKE UP?: NO
2. HAVE YOU ACTUALLY HAD ANY THOUGHTS OF KILLING YOURSELF?: NO

## 2023-12-07 ASSESSMENT — PAIN - FUNCTIONAL ASSESSMENT
PAIN_FUNCTIONAL_ASSESSMENT: 0-10
PAIN_FUNCTIONAL_ASSESSMENT: FLACC (FACE, LEGS, ACTIVITY, CRY, CONSOLABILITY)
PAIN_FUNCTIONAL_ASSESSMENT: 0-10

## 2023-12-07 ASSESSMENT — PAIN SCALES - GENERAL
PAINLEVEL_OUTOF10: 0 - NO PAIN
PAIN_LEVEL: 5
PAINLEVEL_OUTOF10: 0 - NO PAIN
PAINLEVEL_OUTOF10: 0 - NO PAIN

## 2023-12-07 NOTE — POST-PROCEDURE NOTE
1718- pt brought back from pacu,verbal report received. Pt is alert and awake. Wife at bedside. Rx to go meds in room.     1740- vss. Discharge instructions given and explained to pt and wife. Both verbally understood. Pt continuing to rest at this time.     1801- pt getting dressed at this time with steady gait and assistance of wife.     1803- pt ambulated to BR with steady gait.     1815- pt brought down to er entrance via wheelchair by this nurse, and was picked up by wife.

## 2023-12-07 NOTE — ANESTHESIA PREPROCEDURE EVALUATION
Patient: Rios Malcolm    Procedure Information       Date/Time: 12/07/23 1300    Procedure: Repair Fistula Anus    Location: [facility] / [facility]    Surgeons: Raimundo Taylor MD            Relevant Problems   Cardiovascular   (+) Hyperlipidemia      Endocrine   (+) Obesity      GI   (+) Irritable bowel syndrome   (+) Rectal bleeding       Clinical information reviewed:   Tobacco  Allergies  Meds   Med Hx  Surg Hx   Fam Hx  Soc Hx      Past Medical History:   Diagnosis Date    Abscess     clara rectal    Gallbladder attack     Hyperlipemia      Past Surgical History:   Procedure Laterality Date    ABSCESS DRAINAGE  2006    clara rectal abscess    CHOLECYSTECTOMY      HERNIA REPAIR      RIGHT COLECTOMY         NPO Detail:  NPO/Void Status  Date of Last Liquid: 12/07/23  Time of Last Liquid: 0800  Date of Last Solid: 12/06/23  Time of Last Solid: 2100  Time of Last Void: 0900         Physical Exam    Airway  Mallampati: III  TM distance: >3 FB  Neck ROM: full     Cardiovascular   Comments: deferred   Dental    Pulmonary   Comments: deferred   Abdominal     Comments: deferred           Anesthesia Plan    ASA 2     general   (LMA vs ETT depending on procedure positioning)  The patient is not a current smoker.    intravenous induction   Postoperative administration of opioids is intended.  Anesthetic plan and risks discussed with patient.    Plan discussed with CRNA.

## 2023-12-07 NOTE — ANESTHESIA PROCEDURE NOTES
Airway  Date/Time: 12/7/2023 4:00 PM  Urgency: elective    Airway not difficult    Staffing  Performed: CRNA   Authorized by: Joseph Cervantes MD    Performed by: CHRISTELLE Saxena-CRNA  Patient location during procedure: OR    Indications and Patient Condition  Indications for airway management: anesthesia  Spontaneous Ventilation: absent  Sedation level: deep  Preoxygenated: yes  Patient position: sniffing  MILS maintained throughout  Mask difficulty assessment: 0 - not attempted    Final Airway Details  Final airway type: supraglottic airway      Successful airway: Size 5     Number of attempts at approach: 1

## 2023-12-07 NOTE — OP NOTE
Repair Fistula Anus Operative Note     Date: 2023  OR Location: [facility]    Name: Rios Malcolm, : 1955, Age: 68 y.o., MRN: [id number], Sex: male    Diagnosis  Pre-op Diagnosis     * Anal fistula [K60.3] Post-op Diagnosis     * Anal fistula [K60.3]     Procedures  Repair Fistula Anus  99412 - [place] SURG TX ANAL FISTULA INTERSPHINCTERIC      Surgeons      * Raimundo Taylor - Primary    Resident/Fellow/Other Assistant:  Surgeon(s) and Role:    Procedure Summary  Anesthesia: Consult  ASA: ASA status not filed in the log.  Anesthesia Staff: Anesthesiologist: Joseph Cervantes MD  CRNA: CHRISTELLE Saxena-PENG  Estimated Blood Loss: 5 mL  Intra-op Medications: * No intraprocedure medications in log *           Anesthesia Record               Intraprocedure I/O Totals          Intake    LR 1000.00 mL    Total Intake 1000 mL          Specimen: No specimens collected     Staff:   Circulator: Franco Nicole RN  Relief Circulator: Adeel Bhatia RN  Scrub Person: Heather Lorenzo         Drains and/or Catheters: * None in log *    Tourniquet Times:         Implants:     Findings: Posterior midline anal fistula, minimal external sphincter involvement    Indications: Rios Malcolm is an 68 y.o. male who is having surgery for Anal fistula [K60.3].     The patient was seen in the preoperative area. The risks, benefits, complications, treatment options, non-operative alternatives, expected recovery and outcomes were discussed with the patient. The possibilities of reaction to medication, pulmonary aspiration, injury to surrounding structures, bleeding, recurrent infection, the need for additional procedures, failure to diagnose a condition, and creating a complication requiring transfusion or operation were discussed with the patient. The patient concurred with the proposed plan, giving informed consent.  The site of surgery was properly noted/marked if necessary per policy. The patient has been actively warmed in  preoperative area. Preoperative antibiotics not indicated venous thrombosis prophylaxis, SCDs    Procedure Details:   Patient was identified in the preoperative area and transferred to the operating room.  They were placed supine on the OR table.  Anesthesia was induced.  They repositioned to modified lithotomy with yellowfin stirrups and appropriate padding.  Perianal region was clipped, prepped, draped you sterile fashion.  Timeout was performed confirming patient, procedure, preoperative criteria.  Perianal block was completed with 1% lidocaine with epinephrine and quarter percent Marcaine using a total of 20 cc.  On external exam there were large external hemorrhoids and an opening in the posterior midline about 2 cm from anal verge.  Hill-Gandhi was inserted with no significant anal canal pathology.  A fistula probe was easily passed from the external opening to a posterior midline internal opening revealing a primarily intersphincteric fistula with minimal external sphincter involvement.  This was opened with electrocautery and the base was curetted.  Hemostasis was achieved with electrocautery and one 3-0 Vicryl suture.  Prior to completion of the case all counts are correct.  Patient was awakened and transferred to the PACU in stable condition.   Complications:  None; patient tolerated the procedure well.    Disposition: PACU - hemodynamically stable.  Condition: stable         Additional Details:     Attending Attestation:     Raimundo Taylor  Phone Number: [phone]

## 2023-12-07 NOTE — PRE-PROCEDURE NOTE
OR called out & stated it was going to be 2 more hours with case that they're on & to give pt option to wait or reschedule. Pt wants to wait. Dr Taylor made aware.

## 2023-12-08 NOTE — ANESTHESIA POSTPROCEDURE EVALUATION
Patient: Rios Malcolm    Procedure Summary       Date: 12/07/23 Room / Location: [facility] / [facility]    Anesthesia Start: 1546 Anesthesia Stop: 1647    Procedure: Repair Fistula Anus Diagnosis:       Anal fistula      (Anal fistula [K60.3])    Surgeons: Raimundo Taylor MD Responsible Provider: Joseph Cervantes MD    Anesthesia Type: general ASA Status: 2            Anesthesia Type: general    Vitals Value Taken Time   /78 12/07/23 1716   Temp 36.1 °C (97 °F) 12/07/23 1715   Pulse 48 12/07/23 1716   Resp 14 12/07/23 1716   SpO2 97 % 12/07/23 1716   Vitals shown include unvalidated device data.    Anesthesia Post Evaluation    Patient location during evaluation: PACU  Patient participation: complete - patient participated  Level of consciousness: awake and alert  Pain score: 5  Pain management: satisfactory to patient  Airway patency: patent  Cardiovascular status: hemodynamically stable  Respiratory status: acceptable  Hydration status: acceptable  Postoperative Nausea and Vomiting: none  Comments: PONV absent        No notable events documented.

## 2023-12-22 ENCOUNTER — APPOINTMENT (OUTPATIENT)
Dept: SURGERY | Facility: CLINIC | Age: 68
End: 2023-12-22
Payer: COMMERCIAL

## 2024-01-05 ENCOUNTER — OFFICE VISIT (OUTPATIENT)
Dept: SURGERY | Facility: CLINIC | Age: 69
End: 2024-01-05
Payer: COMMERCIAL

## 2024-01-05 VITALS
DIASTOLIC BLOOD PRESSURE: 99 MMHG | RESPIRATION RATE: 17 BRPM | HEART RATE: 61 BPM | HEIGHT: 72 IN | BODY MASS INDEX: 28.71 KG/M2 | WEIGHT: 212 LBS | TEMPERATURE: 97 F | SYSTOLIC BLOOD PRESSURE: 170 MMHG

## 2024-01-05 DIAGNOSIS — K60.3 ANAL FISTULA: Primary | ICD-10-CM

## 2024-01-05 PROCEDURE — 1159F MED LIST DOCD IN RCRD: CPT | Performed by: SURGERY

## 2024-01-05 PROCEDURE — 1126F AMNT PAIN NOTED NONE PRSNT: CPT | Performed by: SURGERY

## 2024-01-05 PROCEDURE — 46600 DIAGNOSTIC ANOSCOPY SPX: CPT | Performed by: SURGERY

## 2024-01-05 PROCEDURE — 1036F TOBACCO NON-USER: CPT | Performed by: SURGERY

## 2024-01-05 PROCEDURE — 99024 POSTOP FOLLOW-UP VISIT: CPT | Performed by: SURGERY

## 2024-01-05 ASSESSMENT — ENCOUNTER SYMPTOMS
DEPRESSION: 0
EYES NEGATIVE: 1
CONSTITUTIONAL NEGATIVE: 1
GASTROINTESTINAL NEGATIVE: 1
RESPIRATORY NEGATIVE: 1
OCCASIONAL FEELINGS OF UNSTEADINESS: 0
CARDIOVASCULAR NEGATIVE: 1
LOSS OF SENSATION IN FEET: 0

## 2024-01-05 ASSESSMENT — LIFESTYLE VARIABLES
HOW OFTEN DO YOU HAVE A DRINK CONTAINING ALCOHOL: 4 OR MORE TIMES A WEEK
HOW OFTEN DO YOU HAVE SIX OR MORE DRINKS ON ONE OCCASION: NEVER
SKIP TO QUESTIONS 9-10: 1
HOW MANY STANDARD DRINKS CONTAINING ALCOHOL DO YOU HAVE ON A TYPICAL DAY: 1 OR 2
AUDIT-C TOTAL SCORE: 4

## 2024-01-05 ASSESSMENT — COLUMBIA-SUICIDE SEVERITY RATING SCALE - C-SSRS
6. HAVE YOU EVER DONE ANYTHING, STARTED TO DO ANYTHING, OR PREPARED TO DO ANYTHING TO END YOUR LIFE?: NO
2. HAVE YOU ACTUALLY HAD ANY THOUGHTS OF KILLING YOURSELF?: NO
1. IN THE PAST MONTH, HAVE YOU WISHED YOU WERE DEAD OR WISHED YOU COULD GO TO SLEEP AND NOT WAKE UP?: NO

## 2024-01-05 ASSESSMENT — PATIENT HEALTH QUESTIONNAIRE - PHQ9
1. LITTLE INTEREST OR PLEASURE IN DOING THINGS: NOT AT ALL
2. FEELING DOWN, DEPRESSED OR HOPELESS: NOT AT ALL
SUM OF ALL RESPONSES TO PHQ9 QUESTIONS 1 & 2: 0

## 2024-01-05 ASSESSMENT — PAIN SCALES - GENERAL: PAINLEVEL: 0-NO PAIN

## 2024-01-05 NOTE — PROGRESS NOTES
[facility]: GENERAL SURGERY  PROGRESS NOTE      Rios Malcolm is a 68 y.o. male that is presenting today for Post-op (POST-OP 12/07/2023 FISTULOTOMY ).    ASSESSMENT / PLAN:  Diagnoses and all orders for this visit:  Anal fistula  Healing as expected.  Discussed ongoing fiber supplementation until complete resolution of thrombosed hemorrhoid.  Call with any questions or issues.  Patient Active Problem List   Diagnosis    Weight gain    Scrotal swelling    Rectal bleeding    Pain    Obesity    Nocturia    Left inguinal hernia    Irritable bowel syndrome    Impaired fasting glucose    Hyperlipidemia    [place] syndrome    Fatigue    Facial mass    Enlarged prostate    Elevated PSA    Diverticulosis    Abnormal laboratory test result    Anal fistula     Subjective   OR 12/7 posterior midline fistulotomy.  Minor spotting after surgery but no significant pain or constipation.    Review of Systems   Constitutional: Negative.    HENT: Negative.     Eyes: Negative.    Respiratory: Negative.     Cardiovascular: Negative.    Gastrointestinal: Negative.    Genitourinary: Negative.    Skin: Negative.    All other systems reviewed and are negative.     Objective   Vitals:    01/05/24 1004   BP: (!) 170/99   Pulse: 61   Resp: 17   Temp: 36.1 °C (97 °F)      Physical Exam  Constitutional:       Appearance: Normal appearance.   HENT:      Head: Normocephalic.   Eyes:      Pupils: Pupils are equal, round, and reactive to light.   Cardiovascular:      Rate and Rhythm: Normal rate.   Pulmonary:      Effort: Pulmonary effort is normal.   Abdominal:      General: Abdomen is flat. Bowel sounds are normal.      Palpations: Abdomen is soft.   Genitourinary:     Comments: The patient was placed in prone Kraske position. Perianal skin was examined without abnormality.  Small thrombosed hemorrhoid in the right posterior quadrant, nontender to palpation.  Digital rectal exam revealed normal tone with good squeeze.  No palpable  "masses appreciated.  Anoscope was inserted with prior fistulotomy site in the posterior midline healing as expected  Skin:     General: Skin is warm.   Neurological:      General: No focal deficit present.      Mental Status: He is alert.         Diagnostic Results   Lab Results   Component Value Date    GLUCOSE 89 10/31/2023    CALCIUM 9.6 10/31/2023     10/31/2023    K 4.8 10/31/2023    CO2 29 10/31/2023     10/31/2023    BUN 14 10/31/2023    CREATININE 1.00 10/31/2023     Lab Results   Component Value Date    ALT 16 10/31/2023    AST 18 10/31/2023    ALKPHOS 84 10/31/2023    BILITOT 3.1 (H) 10/31/2023     Lab Results   Component Value Date    WBC 7.0 10/31/2023    HGB 14.9 10/31/2023    HCT 45.4 10/31/2023    MCV 94 10/31/2023     10/31/2023     Lab Results   Component Value Date    CHOL 191 10/31/2023    CHOL 205 (H) 10/27/2022    CHOL 189 10/18/2021     Lab Results   Component Value Date    HDL 61.0 10/31/2023    HDL 69 10/27/2022    HDL 62 10/18/2021     Lab Results   Component Value Date    LDLCALC 109 10/31/2023    LDLCALC 118 10/27/2022    LDLCALC 106 10/18/2021     Lab Results   Component Value Date    TRIG 107 10/31/2023    TRIG 90 10/27/2022    TRIG 105 10/18/2021     No components found for: \"CHOLHDL\"  Lab Results   Component Value Date    HGBA1C 5.3 10/31/2023     Other labs not included in the list above were reviewed either before or during this encounter.    History    Past Medical History:   Diagnosis Date    Abscess     clara rectal    Gallbladder attack     Hyperlipemia      Past Surgical History:   Procedure Laterality Date    ABSCESS DRAINAGE  2006    clara rectal abscess    ANAL FISTULOTOMY      CHOLECYSTECTOMY      HERNIA REPAIR      RIGHT COLECTOMY       Family History   Problem Relation Name Age of Onset    No Known Problems Father      Breast cancer Sister      Ovarian cancer Sister       No Known Allergies  Current Outpatient Medications on File Prior to Visit "   Medication Sig Dispense Refill    acetaminophen (Tylenol) 325 mg tablet Take 2 tablets (650 mg) by mouth every 6 hours if needed for mild pain (1 - 3) for up to 30 doses. 30 tablet 0    ibuprofen 600 mg tablet Take 1 tablet (600 mg) by mouth every 6 hours if needed for moderate pain (4 - 6) for up to 30 doses. 30 tablet 0    oxyCODONE (Roxicodone) 5 mg immediate release tablet Take 1 tablet (5 mg) by mouth every 6 hours if needed for severe pain (7 - 10) for up to 14 doses. 14 tablet 0    polyethylene glycol (Glycolax, Miralax) 17 gram/dose powder Dissolve 17 g in liquid and drink by mouth once daily as needed (for constipation) for up to 10 doses. 238 g 0     No current facility-administered medications on file prior to visit.     Immunization History   Administered Date(s) Administered    Flu vaccine, quadrivalent, high-dose, preservative free, age 65y+ (FLUZONE) 10/18/2021, 10/27/2022    Influenza, injectable, quadrivalent 09/24/2018, 09/26/2019, 10/13/2020    Influenza, seasonal, injectable 08/28/2014, 09/13/2017    Influenza, seasonal, injectable, preservative free 11/01/2012, 09/29/2015    Moderna SARS-CoV-2 Vaccination 03/05/2021, 04/02/2021    Pfizer COVID-19 vaccine, Fall 2023, 12 years and older, (30mcg/0.3mL) 10/07/2023    Pfizer COVID-19 vaccine, bivalent, age 12 years and older (30 mcg/0.3 mL) 02/04/2023    Pfizer Gray Cap SARS-CoV-2 07/21/2022    Pfizer Purple Cap SARS-CoV-2 11/18/2021    Pneumococcal polysaccharide vaccine, 23-valent, age 2 years and older (PNEUMOVAX 23) 07/02/2013, 09/24/2018    Tdap vaccine, age 7 year and older (BOOSTRIX) 07/16/2011, 10/18/2021    Zoster vaccine, recombinant, adult (SHINGRIX) 12/27/2022, 02/25/2023     Patient's medical history was reviewed and updated either before or during this encounter.    Raimundo Taylor MD

## 2024-11-29 ENCOUNTER — LAB (OUTPATIENT)
Dept: LAB | Facility: LAB | Age: 69
End: 2024-11-29
Payer: COMMERCIAL

## 2024-11-29 ENCOUNTER — APPOINTMENT (OUTPATIENT)
Dept: PRIMARY CARE | Facility: CLINIC | Age: 69
End: 2024-11-29
Payer: COMMERCIAL

## 2024-11-29 VITALS
BODY MASS INDEX: 27.5 KG/M2 | SYSTOLIC BLOOD PRESSURE: 120 MMHG | WEIGHT: 203 LBS | OXYGEN SATURATION: 96 % | DIASTOLIC BLOOD PRESSURE: 80 MMHG | HEIGHT: 72 IN | HEART RATE: 76 BPM

## 2024-11-29 DIAGNOSIS — K57.90 DIVERTICULOSIS: ICD-10-CM

## 2024-11-29 DIAGNOSIS — K58.0 IRRITABLE BOWEL SYNDROME WITH DIARRHEA: ICD-10-CM

## 2024-11-29 DIAGNOSIS — R73.01 IMPAIRED FASTING GLUCOSE: ICD-10-CM

## 2024-11-29 DIAGNOSIS — Z12.5 SCREENING FOR PROSTATE CANCER: ICD-10-CM

## 2024-11-29 DIAGNOSIS — Z00.00 ROUTINE MEDICAL EXAM: ICD-10-CM

## 2024-11-29 DIAGNOSIS — F10.10 ETOH ABUSE: ICD-10-CM

## 2024-11-29 DIAGNOSIS — E78.49 OTHER HYPERLIPIDEMIA: ICD-10-CM

## 2024-11-29 DIAGNOSIS — E80.4 GILBERTS SYNDROME: ICD-10-CM

## 2024-11-29 DIAGNOSIS — R97.20 ELEVATED PSA: ICD-10-CM

## 2024-11-29 DIAGNOSIS — Z00.00 ROUTINE MEDICAL EXAM: Primary | ICD-10-CM

## 2024-11-29 PROBLEM — R35.1 NOCTURIA: Status: RESOLVED | Noted: 2022-10-27 | Resolved: 2024-11-29

## 2024-11-29 PROBLEM — R89.9 ABNORMAL LABORATORY TEST RESULT: Status: RESOLVED | Noted: 2020-11-17 | Resolved: 2024-11-29

## 2024-11-29 PROBLEM — R68.89 ABNORMAL FINDING ON EVALUATION PROCEDURE: Status: RESOLVED | Noted: 2020-11-17 | Resolved: 2024-11-29

## 2024-11-29 PROBLEM — K62.5 RECTAL BLEEDING: Status: RESOLVED | Noted: 2018-09-23 | Resolved: 2024-11-29

## 2024-11-29 PROBLEM — R22.0 FACIAL MASS: Status: RESOLVED | Noted: 2019-09-28 | Resolved: 2024-11-29

## 2024-11-29 PROBLEM — R52 PAIN: Status: RESOLVED | Noted: 2023-10-26 | Resolved: 2024-11-29

## 2024-11-29 PROBLEM — K61.1 PERIRECTAL ABSCESS: Status: RESOLVED | Noted: 2023-11-16 | Resolved: 2024-11-29

## 2024-11-29 PROBLEM — N50.89 SCROTAL SWELLING: Status: RESOLVED | Noted: 2023-10-26 | Resolved: 2024-11-29

## 2024-11-29 PROBLEM — R63.5 WEIGHT GAIN: Status: RESOLVED | Noted: 2021-10-18 | Resolved: 2024-11-29

## 2024-11-29 PROBLEM — K60.30 ANAL FISTULA: Status: RESOLVED | Noted: 2023-11-16 | Resolved: 2024-11-29

## 2024-11-29 LAB
ALBUMIN SERPL BCP-MCNC: 4.5 G/DL (ref 3.4–5)
ALP SERPL-CCNC: 64 U/L (ref 33–136)
ALT SERPL W P-5'-P-CCNC: 14 U/L (ref 10–52)
ANION GAP SERPL CALCULATED.3IONS-SCNC: 10 MMOL/L (ref 10–20)
AST SERPL W P-5'-P-CCNC: 16 U/L (ref 9–39)
BASOPHILS # BLD AUTO: 0.05 X10*3/UL (ref 0–0.1)
BASOPHILS NFR BLD AUTO: 0.7 %
BILIRUB SERPL-MCNC: 1.3 MG/DL (ref 0–1.2)
BUN SERPL-MCNC: 24 MG/DL (ref 6–23)
CALCIUM SERPL-MCNC: 9.1 MG/DL (ref 8.6–10.3)
CHLORIDE SERPL-SCNC: 106 MMOL/L (ref 98–107)
CHOLEST SERPL-MCNC: 194 MG/DL (ref 0–199)
CHOLEST/HDLC SERPL: 2.7 {RATIO}
CO2 SERPL-SCNC: 30 MMOL/L (ref 21–32)
CREAT SERPL-MCNC: 0.98 MG/DL (ref 0.5–1.3)
EGFRCR SERPLBLD CKD-EPI 2021: 84 ML/MIN/1.73M*2
EOSINOPHIL # BLD AUTO: 0.15 X10*3/UL (ref 0–0.7)
EOSINOPHIL NFR BLD AUTO: 2.1 %
ERYTHROCYTE [DISTWIDTH] IN BLOOD BY AUTOMATED COUNT: 13.8 % (ref 11.5–14.5)
EST. AVERAGE GLUCOSE BLD GHB EST-MCNC: 108 MG/DL
FOLATE SERPL-MCNC: 10.6 NG/ML
GLUCOSE SERPL-MCNC: 107 MG/DL (ref 74–99)
HBA1C MFR BLD: 5.4 %
HCT VFR BLD AUTO: 44.3 % (ref 41–52)
HDLC SERPL-MCNC: 71.3 MG/DL
HGB BLD-MCNC: 14.3 G/DL (ref 13.5–17.5)
IMM GRANULOCYTES # BLD AUTO: 0.03 X10*3/UL (ref 0–0.7)
IMM GRANULOCYTES NFR BLD AUTO: 0.4 % (ref 0–0.9)
LDLC SERPL CALC-MCNC: 112 MG/DL
LYMPHOCYTES # BLD AUTO: 1.45 X10*3/UL (ref 1.2–4.8)
LYMPHOCYTES NFR BLD AUTO: 20.3 %
MCH RBC QN AUTO: 30.8 PG (ref 26–34)
MCHC RBC AUTO-ENTMCNC: 32.3 G/DL (ref 32–36)
MCV RBC AUTO: 95 FL (ref 80–100)
MONOCYTES # BLD AUTO: 0.67 X10*3/UL (ref 0.1–1)
MONOCYTES NFR BLD AUTO: 9.4 %
NEUTROPHILS # BLD AUTO: 4.78 X10*3/UL (ref 1.2–7.7)
NEUTROPHILS NFR BLD AUTO: 67.1 %
NON HDL CHOLESTEROL: 123 MG/DL (ref 0–149)
NRBC BLD-RTO: 0 /100 WBCS (ref 0–0)
PLATELET # BLD AUTO: 321 X10*3/UL (ref 150–450)
POC APPEARANCE, URINE: CLEAR
POC BILIRUBIN, URINE: NEGATIVE
POC BLOOD, URINE: NEGATIVE
POC COLOR, URINE: YELLOW
POC GLUCOSE, URINE: NEGATIVE MG/DL
POC KETONES, URINE: ABNORMAL MG/DL
POC LEUKOCYTES, URINE: NEGATIVE
POC NITRITE,URINE: NEGATIVE
POC PH, URINE: 5.5 PH
POC PROTEIN, URINE: NEGATIVE MG/DL
POC SPECIFIC GRAVITY, URINE: 1.02
POC UROBILINOGEN, URINE: 0.2 EU/DL
POTASSIUM SERPL-SCNC: 4.3 MMOL/L (ref 3.5–5.3)
PROT SERPL-MCNC: 6.7 G/DL (ref 6.4–8.2)
PSA SERPL-MCNC: 4.75 NG/ML
RBC # BLD AUTO: 4.65 X10*6/UL (ref 4.5–5.9)
SODIUM SERPL-SCNC: 142 MMOL/L (ref 136–145)
TRIGL SERPL-MCNC: 56 MG/DL (ref 0–149)
TSH SERPL-ACNC: 1.96 MIU/L (ref 0.44–3.98)
VIT B12 SERPL-MCNC: 295 PG/ML (ref 211–911)
VLDL: 11 MG/DL (ref 0–40)
WBC # BLD AUTO: 7.1 X10*3/UL (ref 4.4–11.3)

## 2024-11-29 PROCEDURE — 84425 ASSAY OF VITAMIN B-1: CPT

## 2024-11-29 PROCEDURE — 36415 COLL VENOUS BLD VENIPUNCTURE: CPT

## 2024-11-29 PROCEDURE — 84443 ASSAY THYROID STIM HORMONE: CPT

## 2024-11-29 PROCEDURE — 3008F BODY MASS INDEX DOCD: CPT | Performed by: PHYSICIAN ASSISTANT

## 2024-11-29 PROCEDURE — 84153 ASSAY OF PSA TOTAL: CPT

## 2024-11-29 PROCEDURE — 82607 VITAMIN B-12: CPT

## 2024-11-29 PROCEDURE — 1160F RVW MEDS BY RX/DR IN RCRD: CPT | Performed by: PHYSICIAN ASSISTANT

## 2024-11-29 PROCEDURE — 85025 COMPLETE CBC W/AUTO DIFF WBC: CPT

## 2024-11-29 PROCEDURE — 83036 HEMOGLOBIN GLYCOSYLATED A1C: CPT

## 2024-11-29 PROCEDURE — 90471 IMMUNIZATION ADMIN: CPT | Performed by: PHYSICIAN ASSISTANT

## 2024-11-29 PROCEDURE — 80061 LIPID PANEL: CPT

## 2024-11-29 PROCEDURE — 81003 URINALYSIS AUTO W/O SCOPE: CPT | Performed by: PHYSICIAN ASSISTANT

## 2024-11-29 PROCEDURE — 99397 PER PM REEVAL EST PAT 65+ YR: CPT | Performed by: PHYSICIAN ASSISTANT

## 2024-11-29 PROCEDURE — 1036F TOBACCO NON-USER: CPT | Performed by: PHYSICIAN ASSISTANT

## 2024-11-29 PROCEDURE — 1159F MED LIST DOCD IN RCRD: CPT | Performed by: PHYSICIAN ASSISTANT

## 2024-11-29 PROCEDURE — 82746 ASSAY OF FOLIC ACID SERUM: CPT

## 2024-11-29 PROCEDURE — 90677 PCV20 VACCINE IM: CPT | Performed by: PHYSICIAN ASSISTANT

## 2024-11-29 PROCEDURE — 80053 COMPREHEN METABOLIC PANEL: CPT

## 2024-11-29 PROCEDURE — 1158F ADVNC CARE PLAN TLK DOCD: CPT | Performed by: PHYSICIAN ASSISTANT

## 2024-11-29 PROCEDURE — 1123F ACP DISCUSS/DSCN MKR DOCD: CPT | Performed by: PHYSICIAN ASSISTANT

## 2024-11-29 ASSESSMENT — ENCOUNTER SYMPTOMS
DIARRHEA: 1
PALPITATIONS: 0
CHEST TIGHTNESS: 0
ACTIVITY CHANGE: 0
WHEEZING: 0
ABDOMINAL PAIN: 0
MYALGIAS: 0
BLOOD IN STOOL: 0
DIZZINESS: 0
SLEEP DISTURBANCE: 0
SHORTNESS OF BREATH: 0
CONSTIPATION: 0
COLOR CHANGE: 0
VOMITING: 0
FREQUENCY: 0
LIGHT-HEADEDNESS: 0
NERVOUS/ANXIOUS: 0
TREMORS: 0
NAUSEA: 0
APPETITE CHANGE: 0
ARTHRALGIAS: 0

## 2024-11-29 ASSESSMENT — COLUMBIA-SUICIDE SEVERITY RATING SCALE - C-SSRS
2. HAVE YOU ACTUALLY HAD ANY THOUGHTS OF KILLING YOURSELF?: NO
6. HAVE YOU EVER DONE ANYTHING, STARTED TO DO ANYTHING, OR PREPARED TO DO ANYTHING TO END YOUR LIFE?: NO
1. IN THE PAST MONTH, HAVE YOU WISHED YOU WERE DEAD OR WISHED YOU COULD GO TO SLEEP AND NOT WAKE UP?: NO

## 2024-11-29 ASSESSMENT — PATIENT HEALTH QUESTIONNAIRE - PHQ9
SUM OF ALL RESPONSES TO PHQ9 QUESTIONS 1 AND 2: 0
1. LITTLE INTEREST OR PLEASURE IN DOING THINGS: NOT AT ALL
2. FEELING DOWN, DEPRESSED OR HOPELESS: NOT AT ALL

## 2024-11-29 NOTE — PROGRESS NOTES
Subjective   Patient ID: Rios Malcolm is a 69 y.o. male who presents for Annual Exam (EKG 10-18-21 ).    HPI   Patient has a history of IBS.  Takes his fiber medication on an as-needed basis.  Still brews wine or beer.  Not currently exercising.  Review of Systems   Constitutional:  Negative for activity change and appetite change.   HENT:  Negative for dental problem.    Eyes:  Negative for visual disturbance.   Respiratory:  Negative for chest tightness, shortness of breath and wheezing.    Cardiovascular:  Negative for chest pain, palpitations and leg swelling.   Gastrointestinal:  Positive for diarrhea. Negative for abdominal pain, blood in stool, constipation, nausea and vomiting.   Endocrine: Negative for cold intolerance and heat intolerance.   Genitourinary:  Negative for frequency and urgency.   Musculoskeletal:  Negative for arthralgias and myalgias.   Skin:  Negative for color change.   Allergic/Immunologic: Negative for immunocompromised state.   Neurological:  Negative for dizziness, tremors and light-headedness.   Psychiatric/Behavioral:  Negative for behavioral problems and sleep disturbance. The patient is not nervous/anxious.        Objective   BP (!) 154/91 (BP Location: Left arm, Patient Position: Sitting, BP Cuff Size: Adult)   Pulse 76   Ht 1.829 m (6')   Wt 92.1 kg (203 lb)   SpO2 96%   BMI 27.53 kg/m²     Physical Exam  HENT:      Right Ear: Tympanic membrane normal.      Left Ear: Tympanic membrane normal.      Nose: Nose normal.      Mouth/Throat:      Mouth: Mucous membranes are moist.   Cardiovascular:      Rate and Rhythm: Normal rate and regular rhythm.      Pulses: Normal pulses.   Pulmonary:      Effort: Pulmonary effort is normal. No respiratory distress.   Abdominal:      General: Bowel sounds are normal.      Tenderness: There is no abdominal tenderness.   Musculoskeletal:      Cervical back: Normal range of motion. No tenderness.      Right lower leg: No edema.      Left  lower leg: No edema.   Skin:     General: Skin is warm.   Neurological:      General: No focal deficit present.      Mental Status: He is alert. Mental status is at baseline.   Psychiatric:         Mood and Affect: Mood normal.         Thought Content: Thought content normal.         Judgment: Judgment normal.         Assessment/Plan   Diagnoses and all orders for this visit:  Routine medical exam  -     TSH with reflex to Free T4 if abnormal; Future  -     Comprehensive Metabolic Panel; Future  -     CBC and Auto Differential; Future  Impaired fasting glucose  -     TSH with reflex to Free T4 if abnormal; Future  -     Hemoglobin A1C; Future  -     POCT UA Automated manually resulted  Other hyperlipidemia  -     TSH with reflex to Free T4 if abnormal; Future  -     Lipid Panel; Future  -     Comprehensive Metabolic Panel; Future  -     CBC and Auto Differential; Future  -     POCT UA Automated manually resulted  Screening for prostate cancer  -     Prostate Specific Antigen, Screen; Future  [place] syndrome  Elevated PSA  Diverticulosis  Irritable bowel syndrome with diarrhea  ETOH abuse  -     Vitamin B1, Whole Blood; Future  -     Folate; Future  -     Vitamin B12; Future  Other orders  -     Pneumococcal conjugate vaccine, 20-valent (PREVNAR 20)  Update his Prevnar shot today.  Discussed taking vitamins especially with his alcohol intake.  Did suggest taking a fiber supplement daily as well.

## 2024-12-02 DIAGNOSIS — R97.20 ELEVATED PSA: Primary | ICD-10-CM

## 2024-12-02 LAB — VIT B1 PYROPHOSHATE BLD-SCNC: 119 NMOL/L (ref 70–180)

## (undated) DEVICE — PANTY, MESH, XX-LARGE, SHORT TERM, DISP

## (undated) DEVICE — TIP, SUCTION, YANKAUER, BULB, ADULT

## (undated) DEVICE — LUBRICANT, SURGILUBE, STERILE, 2OZ

## (undated) DEVICE — BLADE, SURGICAL, POLYMER COATED P15, STERILE, DISP

## (undated) DEVICE — DRAPE, SHEET, UTILITY, NON ABSORBENT, 18 X 26 IN, LF

## (undated) DEVICE — WAX, BONE, 2.5 GM

## (undated) DEVICE — SOLUTION, IRRIGATION, X RX SODIUM CHL 0.9%, 1000ML BTL

## (undated) DEVICE — SUTURE, CHROMIC GUT, 3-0, SH 27"

## (undated) DEVICE — PAD, SANITARY, MAXIPAD, MODESS, SMALL

## (undated) DEVICE — SUTURE, SILK, 1, 30 IN, LABYRINTH, BLACK

## (undated) DEVICE — SPONGE, GAUZE, AVANT, STERILE, NONWOVEN, 4PLY, 4 X 4, STANDARD

## (undated) DEVICE — ELECTRODE, ELECTROSURGICAL, BLADE, INSULATED, ENT/IMA, STERILE

## (undated) DEVICE — CATHETER, IV, JELCO, 14G X 1.25IN, W/O SAFETY

## (undated) DEVICE — SYRINGE, 20 CC, LUER LOCK

## (undated) DEVICE — GLOVE, SURGICAL, PROTEXIS PI BLUE W/NEUTHERA, 8.0, PF, LF

## (undated) DEVICE — Device

## (undated) DEVICE — DRAPE, SHEET, LAPAROTOMY

## (undated) DEVICE — INSTRUMENT, SUCTION, SIGMOIDOSCOPIC, 18 FR. X 12, W/WINGED CAP SUCTION CONTROL, 6" CLEAR PLASTIC TUBING PRE-ATTACHED"

## (undated) DEVICE — PREP TRAY, VAGINAL